# Patient Record
(demographics unavailable — no encounter records)

---

## 2024-10-17 NOTE — DATA REVIEWED
[de-identified] : MRI BRAIN IMPRESSION: 10/5/24    No evidence of intracranial abnormality.  [de-identified] : MRI ORBITS with and without contrast IMPRESSION: 10/5/24    Ovoid mass in the inferolateral left orbit as described above and with  suggestion of mild left exophthalmos, correlate clinically. Dr. Scott notified  by email. 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 Other etiologies such as schwannoma  cannot be entirely excluded.

## 2024-10-17 NOTE — PHYSICAL EXAM
[Oriented To Time, Place, And Person] : oriented to person, place, and time [Cranial Nerves Oculomotor (III)] : extraocular motion intact [Cranial Nerves Optic (II)] : visual acuity intact bilaterally,  pupils equal round and reactive to light [Cranial Nerves Trigeminal (V)] : facial sensation intact symmetrically [Cranial Nerves Facial (VII)] : face symmetrical [Cranial Nerves Vestibulocochlear (VIII)] : hearing was intact bilaterally [Cranial Nerves Glossopharyngeal (IX)] : tongue and palate midline [Cranial Nerves Accessory (XI - Cranial And Spinal)] : head turning and shoulder shrug symmetric [Cranial Nerves Hypoglossal (XII)] : there was no tongue deviation with protrusion [Motor Tone] : muscle tone was normal in all four extremities [Motor Strength] : muscle strength was normal in all four extremities [Abnormal Walk] : normal gait [Balance] : balance was intact [PERRL With Normal Accommodation] : pupils were equal in size, round, reactive to light, with normal accommodation [Extraocular Movements] : extraocular movements were intact

## 2024-10-17 NOTE — ASSESSMENT
[FreeTextEntry1] : BHAVIN HILL is a 30 year female who presents with mild left eye proptosis and a longstanding history of visual floaters.  I personally reviewed and interpreted her latest MRI brain and orbits with and without gadolinium 10/5/24 which demonstrates an extraconal mass in the left inferolateral orbit which measures approximately 1.9 x 1.4 x 1.8 cm. The mass demonstrates heterogeneous, mixed intermediate and hyperintense signal on T2 and hypointense signal on T1. On postcontrast images there is irregular, patchy enhancement. Subcentimeter T2 signal hypointensity at the posterior aspect of the lesion may represent calcification/phlebolith. The mass contacts the left lateral rectus muscle. 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   Natural history discussed. Alternative management strategies reviewed. Diagnostic cerebral angiography recommended to further characterize the lesion for diagnostic purposes. Risks including but not limited to bleeding, infection, vascular injury, life threatening hematoma, limb ischemia, need for surgical repair, renal failure, stroke, coma/death, weakness/paralysis, visual/sensory loss, loss of speech/language/cognitive/memory function, changes in personality/behavior, failure of procedure, need for additional procedures discussed. Ms. Hill understands the risks, benefits, alternatives and wishes to proceed. We will therefore schedule her for diagnostic cerebral angiogram under conscious sedation in the near future.    Ms Hill has expressed an interest in surgical resection of the mass. I explained that diagnostic angiography will help inform both indications and technical approach for resection. We will help facilitate an appointment with Dr. David Norris to discuss resection of the extraconal intraorbital mass following diagnostic cerebral angiography if indicated. She agrees to this plan.  A total of 60 minutes was spent relative to this encounter.

## 2024-10-17 NOTE — HISTORY OF PRESENT ILLNESS
[de-identified] : BHAVIN HILL is a 30 year female with a PMH of  "a thyroid problem"  who presents to the office today at the request of Dr. Huan Scott for neurosurgical consultation due to a mass found in the inferolateral left orbit on evaluation of left eye proptosis and floaters. She began having visual symptoms in 2017 when she noted 2 small black spots in her vision which were thought to be floaters. Floaters are seen bilaterally, though she feels they are more prominent with left eye vision than right. She denies diplopia or other vision changes. She initially saw Dr. Derrick Davidson in August 2024 who noted proptosis of the left eye, which patient has not noticed before.  Visual field testing was normal on 8/9/24 with Dr. Davidson.  Over the past several months she has also been experiencing hair loss, nausea and other symptoms, and she has been trying to get scheduled to see endocrinology.  Dr. Huan Scott ordered MRI Brain and Orbits for evaluation. The patient reports left periorbital pain which she temporally relates to the diagnosis of the intraorbital mass. Imaging reviewed and detailed below.   Med Hx: muscle spasms on hands and feet, BRCA positive Surg Hx: nasal turbinate reduction - 2023 Meds: vitamin D supplements, multivitamins with iron Allergies: amoxicillin, fentanyl (extreme sedation), hydrocodone (severe nausea, emesis) , oxycodone (severe nausea, emesis), vicodin (severe nausea, emesis) Soc Hx:  never smoker, social ETOH, lives with parents, works as an , mother had cancer

## 2024-10-22 NOTE — PHYSICAL EXAM
[Chaperone Present] : A chaperone was present in the examining room during all aspects of the physical examination [09862] : A chaperone was present during the pelvic exam. [Fully active, able to carry on all pre-disease performance without restriction] : Status 0 - Fully active, able to carry on all pre-disease performance without restriction

## 2024-10-22 NOTE — DISCUSSION/SUMMARY
[Reviewed Clinical Lab Test(s)] : Results of clinical tests were reviewed. [Discuss Alternatives/Risks/Benefits w/Patient] : All alternatives, risks, and benefits were discussed with the patient/family and all questions were answered.  Patient expressed good understanding and appreciates the importance of follow up as recommended. [Visit Time ___ Minutes] : [unfilled] minutes [Face to Face Time___ Minutes] : with [unfilled] minutes in face to face consultation. [FreeTextEntry1] : 31yo P0 w/ BRCA2+ mutation status. New to GYN exams and visits. Considering RR SO and also RR mastectomy. For baseline imaging, labs, first ever pap/hpv test and gc/chlam STI screening. -more than 50% of visit spent face to face with patient reviewing records and interpreting imaging/path/lab results, counseling and coordinating care  -I reviewed in detail her BRCA2+ status. I explained that in patients with known deleterious BRCA2 mutation there are several benefits to prophylactic BSO. One is the decreased risk of developing ovarian cancer, the one other benefit is a decrease in the risk of HR+ breast cancer. The lifetime risk of developing ovarian cancer in patients with deleterious BRCA2 mutation is 20% -I explained that the current recommendation from our governing bodies ( SGO and ACOG ) is that patients undergo RRBSO at the age of 35 or when childbearing is complete. Current data suggest that the origin of most high-grade serous tumors associated with BRCA1 mutation arise from the fallopian tube. A study assessing the effect of risk reducing salpingectomy (without removal of the ovaries) is ongoing. Without these results we cannot know if RRS will be as effective as RRBSO in decreasing risk of ovarian cancer. However, based on current SGO recommendations, it can be considered as an option in younger women who do not yet want to enter surgical menopause. -I explained to patient that although guidelines state age 35 or when childbearing complete, there is data that supports delaying RR surgery in BRCA2 carriers to 45-51yo since highest risk for ovarian cancer falls in 50-60th decades as opposed to BRCA1 carriers whose highest risk for ovarian cancer is in 40th decade. -I explained role of Hysterectomy and that it is not recommended as part of standard RR surgery guideline although can be an option for BRCA1+ patients given the 5-8% risk of developing uterine cancer in this population, or an elective option if patient desires removal. It can also be offered in cases of tamoxifen use as there is an increased risk of uterine pre-cancer and cancer with tamoxifen use. all questions answered and patient expressed understanding -I explained the side effects of pre-menopausal removal of the ovaries in detail. Effectively removal of the ovaries places patients in surgical menopause. Common symptoms include hot flashes, decrease in cognition, skin changes, weight gain, depression, increased heart disease risk/stroke risk/alzheimer's risk, and bone loss. I did review role of HRT as an option post-surgery to reduce sxs and risks. I explained that breast cancer risk does not change if HRT is started however most BRCA carriers prefer removing all breast tissue prior to starting HRT. -Finally I reviewed screening options, although screening modalities have been proven ineffective in detecting ovarian cancer at earlier stages. In the absence of better options pelvic exams, transvaginal ultrasounds and CA-125 every 6 months can be considered for patients who decline surgical intervention. -At this time patient considering RR SO as her first phase of risk reduction. She understands that she will still need monitoring of ovaries and will need a second surgery later in life to remove the ovaries. all questions answered and patient expressed understanding -baseline pelvic sono (transabdom only as per pt request) -ca125 -vaginal candidasis on exam - rx fluconazole sent -pap/hpv testing, Gc/Chlam testing -televisit for results review and to finalize surgical plan -pain/fever/bleeding precautions given

## 2024-10-22 NOTE — DISCUSSION/SUMMARY
[Reviewed Clinical Lab Test(s)] : Results of clinical tests were reviewed. [Discuss Alternatives/Risks/Benefits w/Patient] : All alternatives, risks, and benefits were discussed with the patient/family and all questions were answered.  Patient expressed good understanding and appreciates the importance of follow up as recommended. [Visit Time ___ Minutes] : [unfilled] minutes [Face to Face Time___ Minutes] : with [unfilled] minutes in face to face consultation. [FreeTextEntry1] : 31yo P0 w/ BRCA2+ mutation status. New to GYN exams and visits. Considering RR SO and also RR mastectomy. For baseline imaging, labs, first ever pap/hpv test and gc/chlam STI screening. -more than 50% of visit spent face to face with patient reviewing records and interpreting imaging/path/lab results, counseling and coordinating care  -I reviewed in detail her BRCA2+ status. I explained that in patients with known deleterious BRCA2 mutation there are several benefits to prophylactic BSO. One is the decreased risk of developing ovarian cancer, the one other benefit is a decrease in the risk of HR+ breast cancer. The lifetime risk of developing ovarian cancer in patients with deleterious BRCA2 mutation is 20% -I explained that the current recommendation from our governing bodies ( SGO and ACOG ) is that patients undergo RRBSO at the age of 35 or when childbearing is complete. Current data suggest that the origin of most high-grade serous tumors associated with BRCA1 mutation arise from the fallopian tube. A study assessing the effect of risk reducing salpingectomy (without removal of the ovaries) is ongoing. Without these results we cannot know if RRS will be as effective as RRBSO in decreasing risk of ovarian cancer. However, based on current SGO recommendations, it can be considered as an option in younger women who do not yet want to enter surgical menopause. -I explained to patient that although guidelines state age 35 or when childbearing complete, there is data that supports delaying RR surgery in BRCA2 carriers to 45-49yo since highest risk for ovarian cancer falls in 50-60th decades as opposed to BRCA1 carriers whose highest risk for ovarian cancer is in 40th decade. -I explained role of Hysterectomy and that it is not recommended as part of standard RR surgery guideline although can be an option for BRCA1+ patients given the 5-8% risk of developing uterine cancer in this population, or an elective option if patient desires removal. It can also be offered in cases of tamoxifen use as there is an increased risk of uterine pre-cancer and cancer with tamoxifen use. all questions answered and patient expressed understanding -I explained the side effects of pre-menopausal removal of the ovaries in detail. Effectively removal of the ovaries places patients in surgical menopause. Common symptoms include hot flashes, decrease in cognition, skin changes, weight gain, depression, increased heart disease risk/stroke risk/alzheimer's risk, and bone loss. I did review role of HRT as an option post-surgery to reduce sxs and risks. I explained that breast cancer risk does not change if HRT is started however most BRCA carriers prefer removing all breast tissue prior to starting HRT. -Finally I reviewed screening options, although screening modalities have been proven ineffective in detecting ovarian cancer at earlier stages. In the absence of better options pelvic exams, transvaginal ultrasounds and CA-125 every 6 months can be considered for patients who decline surgical intervention. -At this time patient considering RR SO as her first phase of risk reduction. She understands that she will still need monitoring of ovaries and will need a second surgery later in life to remove the ovaries. all questions answered and patient expressed understanding -baseline pelvic sono (transabdom only as per pt request) -ca125 -vaginal candidasis on exam - rx fluconazole sent -pap/hpv testing, Gc/Chlam testing -televisit for results review and to finalize surgical plan -pain/fever/bleeding precautions given

## 2024-10-22 NOTE — HISTORY OF PRESENT ILLNESS
[FreeTextEntry1] : 29yo P0 LMP 10/6/24 referred for BRCA2+ mutation. Pt's mother was recently diagnosed with vulvar cancer and BRCA2+, and mother was being seen by breast surgeon Dr Degroot and encouraged her daughter to meet with her and also have genetic testing. Pt then had testing sent from breast surgeon in September which also showed BRCA2+, but she has not received genetic counseling. She reports that she has been to a GYN but has  never had a pelvic exam, has never had a pap smear, has never had cervical swab for STI testing. She becomes tearful as she explains that she is uncomfortable with the idea of pelvic exams and also does not know much about her female anatomy. She denies any hx of sexual abuse or trauma.  She is currently on OCP for birth control, sexually active with 1 male partner, believes she has received Gardasil in adolescence. She also reports work up for a number of symptoms including swelling of left orbit and finding of mass on MRI will be undergoing angiogram, also with fatigue, muscle cramps/spasms, N/V/HA over the past few months. She notes she had abnormal thyroid function with repeat normal and possible anti-thyroid antibodies but has had challenges in getting endocrine appointment. denies current n/v/fever/bleeding/bloating. Reports normal urination and BMs.   PMHx: denies PSHx: turbinate reduction/deviated septum repair 3/2023 OBGYNHx: P0, sexually active, has never had GYN exam FamHx: mother with vulvar cancer and BRCA2+; paternal grandfather with an endocrine cancer; denies fam hx of breast ca, colon ca SocHx: social ETOH, denies smoking/illicit drug use All: aversions to oxycodone/vicodine/fentanyl - severe N/V   mammogram: mammo/sono just performed and WNL, will be for MRI in 6mths colonoscopy: N/A   Labs: 9/2024: BRCA2+

## 2024-10-22 NOTE — DISCUSSION/SUMMARY
[Reviewed Clinical Lab Test(s)] : Results of clinical tests were reviewed. [Discuss Alternatives/Risks/Benefits w/Patient] : All alternatives, risks, and benefits were discussed with the patient/family and all questions were answered.  Patient expressed good understanding and appreciates the importance of follow up as recommended. [Visit Time ___ Minutes] : [unfilled] minutes [Face to Face Time___ Minutes] : with [unfilled] minutes in face to face consultation. [FreeTextEntry1] : 29yo P0 w/ BRCA2+ mutation status. New to GYN exams and visits. Considering RR SO and also RR mastectomy. For baseline imaging, labs, first ever pap/hpv test and gc/chlam STI screening. -more than 50% of visit spent face to face with patient reviewing records and interpreting imaging/path/lab results, counseling and coordinating care  -I reviewed in detail her BRCA2+ status. I explained that in patients with known deleterious BRCA2 mutation there are several benefits to prophylactic BSO. One is the decreased risk of developing ovarian cancer, the one other benefit is a decrease in the risk of HR+ breast cancer. The lifetime risk of developing ovarian cancer in patients with deleterious BRCA2 mutation is 20% -I explained that the current recommendation from our governing bodies ( SGO and ACOG ) is that patients undergo RRBSO at the age of 35 or when childbearing is complete. Current data suggest that the origin of most high-grade serous tumors associated with BRCA1 mutation arise from the fallopian tube. A study assessing the effect of risk reducing salpingectomy (without removal of the ovaries) is ongoing. Without these results we cannot know if RRS will be as effective as RRBSO in decreasing risk of ovarian cancer. However, based on current SGO recommendations, it can be considered as an option in younger women who do not yet want to enter surgical menopause. -I explained to patient that although guidelines state age 35 or when childbearing complete, there is data that supports delaying RR surgery in BRCA2 carriers to 45-51yo since highest risk for ovarian cancer falls in 50-60th decades as opposed to BRCA1 carriers whose highest risk for ovarian cancer is in 40th decade. -I explained role of Hysterectomy and that it is not recommended as part of standard RR surgery guideline although can be an option for BRCA1+ patients given the 5-8% risk of developing uterine cancer in this population, or an elective option if patient desires removal. It can also be offered in cases of tamoxifen use as there is an increased risk of uterine pre-cancer and cancer with tamoxifen use. all questions answered and patient expressed understanding -I explained the side effects of pre-menopausal removal of the ovaries in detail. Effectively removal of the ovaries places patients in surgical menopause. Common symptoms include hot flashes, decrease in cognition, skin changes, weight gain, depression, increased heart disease risk/stroke risk/alzheimer's risk, and bone loss. I did review role of HRT as an option post-surgery to reduce sxs and risks. I explained that breast cancer risk does not change if HRT is started however most BRCA carriers prefer removing all breast tissue prior to starting HRT. -Finally I reviewed screening options, although screening modalities have been proven ineffective in detecting ovarian cancer at earlier stages. In the absence of better options pelvic exams, transvaginal ultrasounds and CA-125 every 6 months can be considered for patients who decline surgical intervention. -At this time patient considering RR SO as her first phase of risk reduction. She understands that she will still need monitoring of ovaries and will need a second surgery later in life to remove the ovaries. all questions answered and patient expressed understanding -baseline pelvic sono (transabdom only as per pt request) -ca125 -vaginal candidasis on exam - rx fluconazole sent -pap/hpv testing, Gc/Chlam testing -televisit for results review and to finalize surgical plan -pain/fever/bleeding precautions given

## 2024-10-22 NOTE — PHYSICAL EXAM
[Chaperone Present] : A chaperone was present in the examining room during all aspects of the physical examination [38517] : A chaperone was present during the pelvic exam. [Fully active, able to carry on all pre-disease performance without restriction] : Status 0 - Fully active, able to carry on all pre-disease performance without restriction

## 2024-10-22 NOTE — PHYSICAL EXAM
[Chaperone Present] : A chaperone was present in the examining room during all aspects of the physical examination [[id number]] : A chaperone was present during the pelvic exam. [Fully active, able to carry on all pre-disease performance without restriction] : Status 0 - Fully active, able to carry on all pre-disease performance without restriction

## 2024-10-22 NOTE — HISTORY OF PRESENT ILLNESS
[FreeTextEntry1] : 31yo P0 LMP 10/6/24 referred for BRCA2+ mutation. Pt's mother was recently diagnosed with vulvar cancer and BRCA2+, and mother was being seen by breast surgeon Dr Degroot and encouraged her daughter to meet with her and also have genetic testing. Pt then had testing sent from breast surgeon in September which also showed BRCA2+, but she has not received genetic counseling. She reports that she has been to a GYN but has  never had a pelvic exam, has never had a pap smear, has never had cervical swab for STI testing. She becomes tearful as she explains that she is uncomfortable with the idea of pelvic exams and also does not know much about her female anatomy. She denies any hx of sexual abuse or trauma.  She is currently on OCP for birth control, sexually active with 1 male partner, believes she has received Gardasil in adolescence. She also reports work up for a number of symptoms including swelling of left orbit and finding of mass on MRI will be undergoing angiogram, also with fatigue, muscle cramps/spasms, N/V/HA over the past few months. She notes she had abnormal thyroid function with repeat normal and possible anti-thyroid antibodies but has had challenges in getting endocrine appointment. denies current n/v/fever/bleeding/bloating. Reports normal urination and BMs.   PMHx: denies PSHx: turbinate reduction/deviated septum repair 3/2023 OBGYNHx: P0, sexually active, has never had GYN exam FamHx: mother with vulvar cancer and BRCA2+; paternal grandfather with an endocrine cancer; denies fam hx of breast ca, colon ca SocHx: social ETOH, denies smoking/illicit drug use All: aversions to oxycodone/vicodine/fentanyl - severe N/V   mammogram: mammo/sono just performed and WNL, will be for MRI in 6mths colonoscopy: N/A   Labs: 9/2024: BRCA2+

## 2024-11-11 NOTE — REASON FOR VISIT
[FreeTextEntry1] : BHAVIN HILL is a 30 year female seen in the office today for results review of a diagnostic cerebral angiogram performed independently and reviewed by me on 11/1/24. The angiogram went well and she was discharged later that day.  She was seen in the ER on the evening of 11/1/24 for acute onset of bilateral flashing lights with transient loss of peripheral vision and new onset headaches.  Her symptoms were relieved after she was given ketorolac, acetaminophen, diphenhydramine and metoclopramide and she was discharged home.  Her headaches returned and had been persistent. She also had another similar visual symptom the evening of 11/4/24.  She was seen by Dr. Huan Scott on 11/6/24 and was given a migraine cocktail for a diagnosis of ocular migraine syndrome which she started on 11/8/24.  She reports that she has not had any visual symptoms but still has mild left sided headaches.  She has seen Dr. Norris in operative consultation. Diagnostic cerebral angiogram reviewed detailed below.

## 2024-11-11 NOTE — DATA REVIEWED
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 No evidence for aneurysm, vascular malformation, dural arteriovenous fistula. Normal angiogram.  PROCEDURE: 1. Diagnostic cerebral angiogram with bilateral common carotid, internal carotid, external carotid artery and left vertebral artery. 2. Right common femoral angiogram for evaluation for placement of closure device.  REFERRING PHYSICIAN: Huan Scott M.D  DATE OF SERVICE: 2024  SURGEON: Aakash Palmer M.D.  ASSISTANT/S: JOVANA Rivero P.A  Clinical History: ?30 year old female presents with proptosis noted during ophthalmalogic examination and imaging demonstrating a left intraorbital / intraconal mass lesion suspicious for possible vascular malformation. Risks of diagnostic angiography including but not limited to bleeding, infection, vascular injury, life threatening hematoma, limb ischemia, renal failure, radiation exposure, stroke/coma/death, weakness/paralysis, visual/sensory loss, loss of speech/language/cognitive/memory function, changes in personality/behavior, failure to diagnose, need for additional procedures were discussed. Patient understands risks, benefits, alternatives, and wishes to proceed.  Anesthesia: Monitored anesthesia care. ? Medications: As per anesthesia.  Techniques and findings: Intravenous sedation was induced uneventfully and monitored by anesthesiology. Bilateral groins were prepped and draped in the usual sterile fashion. ?A timeout procedure was documented. The patient's name, date of birth, and medical record number as well as the procedures to be performed were confirmed by the entire team. After everyone in the room agreed, the procedure continued.  The right common femoral artery was accessed with a 4 Austrian micropuncture needle. The needle was exchanged for a 5 Austrian vascular sheath. The sheath was then connected to a continuous heparinized saline flush.  Diagnostic angiogram: Under fluoroscopic guidance, a 5 Austrian [employer] diagnostic catheter was advanced over a 0.035 inch angled glide guidewire through the abdominal and thoracic aorta into the right common carotid artery.  Right common carotid artery angiogram; cervical view Under fluoroscopic guidance, the catheter was advanced into the right common carotid artery. PA and lateral projections demonstrate antegrade flow into the right common, internal, and external carotid arteries. There is no significant atheromatous disease or hemodynamically significant stenosis.  Right external carotid artery angiogram; cranial view Under fluoroscopic guidance, the catheter was advanced into the right external carotid artery over the guidewire. PA and lateral projections demonstrate normal branching pattern of the external carotid artery. Capillary and venous phases are unremarkable. No vascular malformation or fistula is identified.  Right internal carotid artery angiogram; cranial view Under fluoroscopic guidance, the catheter was advanced into the right internal carotid artery. PA, lateral, and oblique projections demonstrate antegrade flow into the intracranial segments of the right internal carotid, right middle cerebral, and right anterior cerebral arteries. The right ophthalmic, posterior communicating, and anterior choroidal arteries are visualized. There is cross filling of the contralateral anterior cerebral artery. Capillary phase is unremarkable. Dural venous sinuses are patent. No aneurysm or vascular malformation is identified.  Left common carotid artery angiograms; cervical view Under fluoroscopic guidance, the catheter was advanced into the left common carotid artery. PA and lateral projections demonstrate antegrade flow into the left common and internal carotid arteries and the left external carotid artery. There is no significant atheromatous disease or hemodynamically significant stenosis.  Left external carotid artery angiogram; cranial view Under fluoroscopic guidance, the catheter was advanced into the left external carotid artery over the guidewire. PA and lateral projections demonstrate normal branching pattern of the external carotid artery. Capillary and venous phases are otherwise unremarkable. No vascular malformation or fistula is identified. No significant arterial supply to the left orbital mass is noted.  Left internal carotid artery angiogram; cranial view Under fluoroscopic guidance, the catheter was advanced into the left internal carotid artery. PA and lateral projections demonstrate antegrade flow into the intracranial segments of the left internal carotid, left middle cerebral, and left anterior cerebral arteries. The left ophthalmic, posterior communicating, and anterior choroidal arteries are visualized. There is cross filling of the contralateral anterior cerebral artery. Capillary phase is unremarkable. Dural venous sinuses are patent. No aneurysm or vascular malformation is identified. No significant arterial supply to the left orbital mass is noted.  Left vertebral artery angiogram; cervical and cranial view Under fluoroscopic guidance, the catheter was advanced into the left vertebral artery. PA and lateral projections of the cranium and upper cervical spine demonstrate antegrade flow into the left vertebral and basilar arteries. The bilateral posterior cerebral and superior cerebellar vessels fill normally. Bilateral AICA vessels fill normally. There is no significant retrograde filling of the right vertebral artery. Left PICA vessel fills normally. Capillary and venous phases are unremarkable. No aneurysm, vascular malformation, or fistula is identified.  Right common femoral artery angiogram for evaluation for placement of closure device Right common femoral artery is visualized. The puncture site is in the common femoral artery proximal to its bifurcation. Both the superficial and the deep femoral artery are visualized with anterograde flow. There is no evidence for dissection or occlusion, proximal or distal to the site of puncture.  CONCLUSION/HEMOSTASIS: The diagnostic catheter was removed. The 5 Austrian sheath was exchanged for a 5 Austrian Vascade extraluminal closure device supplemented with manual compression to achieve hemostasis.  COMPLICATIONS: The patient tolerated the procedure well. There were no complications during the procedure.   IMPRESSION: No evidence for aneurysm, vascular malformation, dural arteriovenous fistula. Hypovascular left intraorbital / intraconal mass lesion without significant arterial supply or overt aberrant venous drainage. Normal angiogram. 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

## 2024-11-11 NOTE — ASSESSMENT
[FreeTextEntry1] : BHAVIN HILL is a 30 year female status post diagnostic cerebral angiogram which I independently performed and reviewed and demonstrates no evidence for aneurysm, vascular malformation, dural arteriovenous fistula. Hypovascular left intraorbital / intraconal mass lesion without significant arterial supply or overt aberrant venous drainage noted. Normal angiogram. Findings support, but are not definitive for, the diagnosis of orbital cavernous venous malformation.   I reviewed the angiographic images with her and counseled her to continue clinical follow up with Dr. Scott for ocular migraine and headache management. She states that she is likely to proceed with surgery with Dr. Norris, though she is seeking a second opinion which I have supported. I explained that my role in her management was to perform diagnostic angiography at the request of Dr. Scott to assess vascularity of lesion in order to help support the development of a management strategy. This strategy however should be developed with an orbital/head and neck surgical team and ophthalmology, and I will defer to the recommendations of these content experts. I remain available for perioperative or intraoperative assistance if requested and required. She expressed her understanding and her gratitude for the care she has received thus far.    I have asked the patient to contact me for any symptomatic development or progression at which time we can obtain expedited follow up imaging. I explained that I remain available to her for any questions or concerns that may arise.    A total of 45 minutes was spent relative to this encounter.

## 2024-11-11 NOTE — DATA REVIEWED
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 No evidence for aneurysm, vascular malformation, dural arteriovenous fistula. Normal angiogram.  PROCEDURE: 1. Diagnostic cerebral angiogram with bilateral common carotid, internal carotid, external carotid artery and left vertebral artery. 2. Right common femoral angiogram for evaluation for placement of closure device.  REFERRING PHYSICIAN: Huan Scott M.D  DATE OF SERVICE: 2024  SURGEON: Aakash Palmer M.D.  ASSISTANT/S: JOVANA Rivero P.A  Clinical History: ?30 year old female presents with proptosis noted during ophthalmalogic examination and imaging demonstrating a left intraorbital / intraconal mass lesion suspicious for possible vascular malformation. Risks of diagnostic angiography including but not limited to bleeding, infection, vascular injury, life threatening hematoma, limb ischemia, renal failure, radiation exposure, stroke/coma/death, weakness/paralysis, visual/sensory loss, loss of speech/language/cognitive/memory function, changes in personality/behavior, failure to diagnose, need for additional procedures were discussed. Patient understands risks, benefits, alternatives, and wishes to proceed.  Anesthesia: Monitored anesthesia care. ? Medications: As per anesthesia.  Techniques and findings: Intravenous sedation was induced uneventfully and monitored by anesthesiology. Bilateral groins were prepped and draped in the usual sterile fashion. ?A timeout procedure was documented. The patient's name, date of birth, and medical record number as well as the procedures to be performed were confirmed by the entire team. After everyone in the room agreed, the procedure continued.  The right common femoral artery was accessed with a 4 Algerian micropuncture needle. The needle was exchanged for a 5 Algerian vascular sheath. The sheath was then connected to a continuous heparinized saline flush.  Diagnostic angiogram: Under fluoroscopic guidance, a 5 Algerian [employer] diagnostic catheter was advanced over a 0.035 inch angled glide guidewire through the abdominal and thoracic aorta into the right common carotid artery.  Right common carotid artery angiogram; cervical view Under fluoroscopic guidance, the catheter was advanced into the right common carotid artery. PA and lateral projections demonstrate antegrade flow into the right common, internal, and external carotid arteries. There is no significant atheromatous disease or hemodynamically significant stenosis.  Right external carotid artery angiogram; cranial view Under fluoroscopic guidance, the catheter was advanced into the right external carotid artery over the guidewire. PA and lateral projections demonstrate normal branching pattern of the external carotid artery. Capillary and venous phases are unremarkable. No vascular malformation or fistula is identified.  Right internal carotid artery angiogram; cranial view Under fluoroscopic guidance, the catheter was advanced into the right internal carotid artery. PA, lateral, and oblique projections demonstrate antegrade flow into the intracranial segments of the right internal carotid, right middle cerebral, and right anterior cerebral arteries. The right ophthalmic, posterior communicating, and anterior choroidal arteries are visualized. There is cross filling of the contralateral anterior cerebral artery. Capillary phase is unremarkable. Dural venous sinuses are patent. No aneurysm or vascular malformation is identified.  Left common carotid artery angiograms; cervical view Under fluoroscopic guidance, the catheter was advanced into the left common carotid artery. PA and lateral projections demonstrate antegrade flow into the left common and internal carotid arteries and the left external carotid artery. There is no significant atheromatous disease or hemodynamically significant stenosis.  Left external carotid artery angiogram; cranial view Under fluoroscopic guidance, the catheter was advanced into the left external carotid artery over the guidewire. PA and lateral projections demonstrate normal branching pattern of the external carotid artery. Capillary and venous phases are otherwise unremarkable. No vascular malformation or fistula is identified. No significant arterial supply to the left orbital mass is noted.  Left internal carotid artery angiogram; cranial view Under fluoroscopic guidance, the catheter was advanced into the left internal carotid artery. PA and lateral projections demonstrate antegrade flow into the intracranial segments of the left internal carotid, left middle cerebral, and left anterior cerebral arteries. The left ophthalmic, posterior communicating, and anterior choroidal arteries are visualized. There is cross filling of the contralateral anterior cerebral artery. Capillary phase is unremarkable. Dural venous sinuses are patent. No aneurysm or vascular malformation is identified. No significant arterial supply to the left orbital mass is noted.  Left vertebral artery angiogram; cervical and cranial view Under fluoroscopic guidance, the catheter was advanced into the left vertebral artery. PA and lateral projections of the cranium and upper cervical spine demonstrate antegrade flow into the left vertebral and basilar arteries. The bilateral posterior cerebral and superior cerebellar vessels fill normally. Bilateral AICA vessels fill normally. There is no significant retrograde filling of the right vertebral artery. Left PICA vessel fills normally. Capillary and venous phases are unremarkable. No aneurysm, vascular malformation, or fistula is identified.  Right common femoral artery angiogram for evaluation for placement of closure device Right common femoral artery is visualized. The puncture site is in the common femoral artery proximal to its bifurcation. Both the superficial and the deep femoral artery are visualized with anterograde flow. There is no evidence for dissection or occlusion, proximal or distal to the site of puncture.  CONCLUSION/HEMOSTASIS: The diagnostic catheter was removed. The 5 Algerian sheath was exchanged for a 5 Algerian Vascade extraluminal closure device supplemented with manual compression to achieve hemostasis.  COMPLICATIONS: The patient tolerated the procedure well. There were no complications during the procedure.   IMPRESSION: No evidence for aneurysm, vascular malformation, dural arteriovenous fistula. Hypovascular left intraorbital / intraconal mass lesion without significant arterial supply or overt aberrant venous drainage. Normal angiogram. 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

## 2024-11-12 NOTE — HISTORY OF PRESENT ILLNESS
[FreeTextEntry1] : 29yo P0 w/ BRCA2+ mutation status. New to GYN exams and visits. Considering RR SO and also RR mastectomy. Televisit today for results review of labs, baseline GYN testing and sono. Since last visit pt without any complaints. She has followed up with neurosurg and is scheduled to meet with ocular surgeon for surgery planned eye surgery in the next few weeks. She met with genetic counselors and has better understanding of BRCA 2 mutation and cancer risks.  She is interested in meeting with [facility] for egg freezing and she would like to move forward with RR salpingectomy some time in the new year/ spring pending eye surgery and changing jobs.  mammogram: mammo/sono just performed and WNL, will be for MRI in 6mths colonoscopy: N/A   Labs: 9/2024: BRCA2+ Pap/HPV 10/2024 = NILM/ HPV neg GC/chlam 10/2024 = neg ca125 10/18/24 = 19  Pelvic sono 10/24/24 FINDINGS: Uterus: 6.7 cm x 2.2 cm x 4.1 cm. Endometrium: 4 mm. Within normal limits.  Right ovary: 2.8 cm x 1.7 cm x 2.6 cm. Within normal limits. Left ovary: 2.9 cm x 1.7 cm x 2.8 cm. Within normal limits.  Fluid: None.  IMPRESSION: Unremarkable transabdominal pelvic sonogram   
I have reviewed and confirmed nurses' notes for patient's medications, allergies, medical history, and surgical history.

## 2024-11-12 NOTE — DISCUSSION/SUMMARY
[Reviewed Clinical Lab Test(s)] : Results of clinical tests were reviewed. [Discuss Alternatives/Risks/Benefits w/Patient] : All alternatives, risks, and benefits were discussed with the patient/family and all questions were answered.  Patient expressed good understanding and appreciates the importance of follow up as recommended. [Visit Time ___ Minutes] : [unfilled] minutes [Reviewed Radiology Report(s)] : Radiology reports were reviewed. [FreeTextEntry1] : 31yo P0 w/ BRCA2+ mutation status. New to GYN exams and visits. Considering RR SO and also RR mastectomy.  -entire televisit spent counseling patient and coordinating care. -I reviewed all normal baseline results and reviewed plan for continued surveillance with q6 month sono and ca125. pt understands that data does not show improved early detection with this screening and it is left to provider/ patient discretion -will plan for RR salpingectomy in the new year. patient understands we do not have enough data to say RRBS is same risk reduction as RRBSO and pt understands that she will need a second surgery later in life for RRBSO. all questions answered and patient expressed understanding -pelvic sono and ca125 in 6 months, future orders placed -rtc 6 months or sooner -referral to [facility] -pain/fever/bleeding precautions given

## 2024-11-14 NOTE — CONSULT LETTER
[Dear  ___] : Dear ~MARQUES, [Consult Letter:] : I had the pleasure of evaluating your patient, [unfilled]. [Please see my note below.] : Please see my note below. [Consult Closing:] : Thank you very much for allowing me to participate in the care of this patient.  If you have any questions, please do not hesitate to contact me. [Sincerely,] : Sincerely, [FreeTextEntry3] : Scott Sylvester M.D.

## 2024-11-14 NOTE — ASSESSMENT
[FreeTextEntry1] : BHAVIN HILL is a 30 year old female with a PMH of proptosis, ocular migraines who presents to the office today for neurosurgical consultation at the request of Dr. Aakash Palmer due to cervical radiculopathy.   We reviewed her MRI cervical spine in the office today. She has a central disc protrusion at C5-6 without any significant central or foraminal stenosis.   I have discussed the natural history and treatment options for cervical radiculopathy with the patient. I explained the indications for observation, conservative management, medical management, physical therapy, pain management approaches and surgery.   In the end, I recommend EMG/NCV upper extremities. She should also continue PT and see pain management. The patient may return to the office once imaging completed for further treatment planning. She was also advised to reach out to us sooner if she is to develop weakness or bowel/bladder symptoms.  I spent 60 minutes relative to this patient encounter.

## 2024-11-14 NOTE — HISTORY OF PRESENT ILLNESS
[de-identified] : BHAVIN HILL is a right handed 30 year old female with a PMH of cavernous hemangioma, Hashimotos thyroiditis, ocular migraines who presents to the office today for neurosurgical consultation at the request of Dr. Aakash Palmer due to cervical radiculopathy.   The pain is characterized as muscle spasm and tinging in nature.  It started June 2024. It radiates into left and right shoulder with tingling down both arms into hands and sometimes her feet. There has been no known trauma precipitating the pain.  The patient denies weakness of extremities or dexterity issues, bowel or bladder incontinence and gait disturbance. The discomfort keeps her from exercising. She has tried PT since August 2024, and pain medications including Prednisone, Naproxen, Tylenol in the last months without relief.  She has undergone imaging in the form of MRI cervical spine which revealed central disc protrusion at C5-C6 contributing to mild canal narrowing. No significant neural foraminal narrowing. No discrete cord abnormality (see detailed report below) She is a  and has not been figure skating due to the discomfort.  Of note, she underwent a diagnostic cerebral angiogram with my partner Dr. Palmer. She continues to have ocular migraines and following with Dr. Scott and Dr. 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  Surg Hx:  nasal turbinate reduction and deviated septum-2023  Meds:  Vitamin D, mvi, birth control, methylprednisone recently completed   Allergies: Fentanyl, hydrocodone, oxycodone, Vicodin   Soc Hx: nonsmoker, social EtOH, lives with parents, works as

## 2024-11-14 NOTE — HISTORY OF PRESENT ILLNESS
[de-identified] : BHAVIN HILL is a right handed 30 year old female with a PMH of cavernous hemangioma, Hashimotos thyroiditis, ocular migraines who presents to the office today for neurosurgical consultation at the request of Dr. Aakash Palmer due to cervical radiculopathy.   The pain is characterized as muscle spasm and tinging in nature.  It started June 2024. It radiates into left and right shoulder with tingling down both arms into hands and sometimes her feet. There has been no known trauma precipitating the pain.  The patient denies weakness of extremities or dexterity issues, bowel or bladder incontinence and gait disturbance. The discomfort keeps her from exercising. She has tried PT since August 2024, and pain medications including Prednisone, Naproxen, Tylenol in the last months without relief.  She has undergone imaging in the form of MRI cervical spine which revealed central disc protrusion at C5-C6 contributing to mild canal narrowing. No significant neural foraminal narrowing. No discrete cord abnormality (see detailed report below) She is a  and has not been figure skating due to the discomfort.  Of note, she underwent a diagnostic cerebral angiogram with my partner Dr. Plamer. She continues to have ocular migraines and following with Dr. Scott and Dr. 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  Surg Hx:  nasal turbinate reduction and deviated septum-2023  Meds:  Vitamin D, mvi, birth control, methylprednisone recently completed   Allergies: Fentanyl, hydrocodone, oxycodone, Vicodin   Soc Hx: nonsmoker, social EtOH, lives with parents, works as

## 2024-11-18 NOTE — ASSESSMENT
[FreeTextEntry1] : #Hair loss new problem, uncertain prognosis likely multifactorial, including 2/2 underlying thyroiditis, telogen effluvium reviewed diagnoses, pathogenesis, natural course, tx options recommend f/up with endocrinologist for tx of underlying thyroiditis discussed tx options including topical minoxidil, oral meds including po minoxidil, spironolactone - recommend holding off on any oral meds at this time due to upcoming surgery pt hesitant to start topical minoxidil - we reviewed risks, potential SE, expectations can continue ketoconazole 2% shampoo   RTC 2-3 months

## 2024-11-18 NOTE — PHYSICAL EXAM
[Alert] : alert [Oriented x 3] : ~L oriented x 3 [Declined] : declined [FreeTextEntry3] : Focused exam: -diffuse hair thinning

## 2024-11-18 NOTE — HISTORY OF PRESENT ILLNESS
[FreeTextEntry1] : hairloss [de-identified] : 30 year old female patient presents c/o hair loss.  in September saw hair loss in a photo of the back of her head feels she has had hair loss over the last few months saw another dermatologist, was told to use topical minoxidil and prescribed ketoconazole 2% shampoo labs 10/25/24: anti-TG, ATPO elevated, Total T4 elevated, T3 elevated, TSH wnl saw Dr. 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

## 2025-01-23 NOTE — HISTORY OF PRESENT ILLNESS
[Home] : at home, [unfilled] , at the time of the visit. [Medical Office: ([facility])___] : at the medical office located in  [Verbal consent obtained from patient] : the patient, [unfilled] [FreeTextEntry1] : hairloss [de-identified] : 30 year old female patient presents for follow up  11/2024 had eye surgery last month - went well thinks hair shedding is stable - her  did not notice any worsening  past history: in September saw hair loss in a photo of the back of her head feels she has had hair loss over the last few months saw another dermatologist, was told to use topical minoxidil and prescribed ketoconazole 2% shampoo labs 10/25/24: anti-TG, ATPO elevated, Total T4 elevated, T3 elevated, TSH wnl saw Dr. 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

## 2025-01-23 NOTE — PHYSICAL EXAM
[Alert] : alert [Oriented x 3] : ~L oriented x 3 [Declined] : declined [FreeTextEntry3] : Focused exam: exam limited over telehealth  no discrete abnormalities on exam

## 2025-05-22 NOTE — HISTORY OF PRESENT ILLNESS
[FreeTextEntry1] : 32yo P0 w/ BRCA2+ mutation status here for surveillance visit.  Since last visit patient reports doing well. she denies pain/fever/bleeding/bloating. She has normal activity tolerance and normal appetite. Reports normal urination and BMs. We reviewed recent sono and ca125 which are both normal. Since last visit she had left eye surgery and has recovered without issues. she moved to [place] and is happy at her new job. she continues to desire RR BS and prophy mastectomy. she is just sorting out timing since she is at a new job.  mammogram: mammo/sono just performed and WNL, will be for MRI in 6mths colonoscopy: N/A   Labs: ca125: 17 (5/16/25), 19 (10/18/24)  Pap/HPV: NILM/ HPV neg (10/18/24) GC/ chlam: Neg (10/18/24)  9/2024: BRCA2+  5/16/25 PELVIC US:  FINDINGS: Uterus: 6.6 cm x 3.6 cm x 4.1 cm. Within normal limits. Endometrium: 3 mm. Within normal limits.  Right ovary: 2.2 cm x 1.9 cm x 2.1 cm. Within normal limits. Normal arterial and venous waveforms. Left ovary: 2.0 cm x 1.3 cm x 1.8 cm. Within normal limits. Normal arterial and venous waveforms.  Fluid: None.  IMPRESSION: Normal pelvic sonogram.   10/24/24 Pelvic US: FINDINGS: Uterus: 6.7 cm x 2.2 cm x 4.1 cm. Endometrium: 4 mm. Within normal limits. Right ovary: 2.8 cm x 1.7 cm x 2.6 cm. Within normal limits. Left ovary: 2.9 cm x 1.7 cm x 2.8 cm. Within normal limits. Fluid: None. IMPRESSION: Unremarkable transabdominal pelvic sonogram

## 2025-05-22 NOTE — HISTORY OF PRESENT ILLNESS
[FreeTextEntry1] : 30yo P0 w/ BRCA2+ mutation status here for surveillance visit.  Since last visit patient reports doing well. she denies pain/fever/bleeding/bloating. She has normal activity tolerance and normal appetite. Reports normal urination and BMs. We reviewed recent sono and ca125 which are both normal. Since last visit she had left eye surgery and has recovered without issues. she moved to [place] and is happy at her new job. she continues to desire RR BS and prophy mastectomy. she is just sorting out timing since she is at a new job.  mammogram: mammo/sono just performed and WNL, will be for MRI in 6mths colonoscopy: N/A   Labs: ca125: 17 (5/16/25), 19 (10/18/24)  Pap/HPV: NILM/ HPV neg (10/18/24) GC/ chlam: Neg (10/18/24)  9/2024: BRCA2+  5/16/25 PELVIC US:  FINDINGS: Uterus: 6.6 cm x 3.6 cm x 4.1 cm. Within normal limits. Endometrium: 3 mm. Within normal limits.  Right ovary: 2.2 cm x 1.9 cm x 2.1 cm. Within normal limits. Normal arterial and venous waveforms. Left ovary: 2.0 cm x 1.3 cm x 1.8 cm. Within normal limits. Normal arterial and venous waveforms.  Fluid: None.  IMPRESSION: Normal pelvic sonogram.   10/24/24 Pelvic US: FINDINGS: Uterus: 6.7 cm x 2.2 cm x 4.1 cm. Endometrium: 4 mm. Within normal limits. Right ovary: 2.8 cm x 1.7 cm x 2.6 cm. Within normal limits. Left ovary: 2.9 cm x 1.7 cm x 2.8 cm. Within normal limits. Fluid: None. IMPRESSION: Unremarkable transabdominal pelvic sonogram

## 2025-05-22 NOTE — HISTORY OF PRESENT ILLNESS
[FreeTextEntry1] : 30yo P0 w/ BRCA2+ mutation status here for surveillance visit.  Since last visit patient reports doing well. she denies pain/fever/bleeding/bloating. She has normal activity tolerance and normal appetite. Reports normal urination and BMs. We reviewed recent sono and ca125 which are both normal. Since last visit she had left eye surgery and has recovered without issues. she moved to [place] and is happy at her new job. she continues to desire RR BS and prophy mastectomy. she is just sorting out timing since she is at a new job.  mammogram: mammo/sono just performed and WNL, will be for MRI in 6mths colonoscopy: N/A   Labs: ca125: 17 (5/16/25), 19 (10/18/24)  Pap/HPV: NILM/ HPV neg (10/18/24) GC/ chlam: Neg (10/18/24)  9/2024: BRCA2+  5/16/25 PELVIC US:  FINDINGS: Uterus: 6.6 cm x 3.6 cm x 4.1 cm. Within normal limits. Endometrium: 3 mm. Within normal limits.  Right ovary: 2.2 cm x 1.9 cm x 2.1 cm. Within normal limits. Normal arterial and venous waveforms. Left ovary: 2.0 cm x 1.3 cm x 1.8 cm. Within normal limits. Normal arterial and venous waveforms.  Fluid: None.  IMPRESSION: Normal pelvic sonogram.   10/24/24 Pelvic US: FINDINGS: Uterus: 6.7 cm x 2.2 cm x 4.1 cm. Endometrium: 4 mm. Within normal limits. Right ovary: 2.8 cm x 1.7 cm x 2.6 cm. Within normal limits. Left ovary: 2.9 cm x 1.7 cm x 2.8 cm. Within normal limits. Fluid: None. IMPRESSION: Unremarkable transabdominal pelvic sonogram

## 2025-05-22 NOTE — DISCUSSION/SUMMARY
[Reviewed Clinical Lab Test(s)] : Results of clinical tests were reviewed. [Reviewed Radiology Report(s)] : Radiology reports were reviewed. [Discuss Alternatives/Risks/Benefits w/Patient] : All alternatives, risks, and benefits were discussed with the patient/family and all questions were answered.  Patient expressed good understanding and appreciates the importance of follow up as recommended. [Visit Time ___ Minutes] : [unfilled] minutes [Face to Face Time___ Minutes] : with [unfilled] minutes in face to face consultation. [FreeTextEntry1] : 32yo P0 w/ BRCA2+ mutation status. New to GYN exams and visits. Considering RR SO and also RR mastectomy.  -more than 50% of visit spent face to face with patient counseling and coordinating care  -I reviewed all normal results and reviewed plan for continued surveillance with q6 month sono and ca125. pt understands that data does not show improved early detection with this screening and it is left to provider/ patient discretion -will plan for RR salpingectomy in the new year. patient understands we do not have enough data to say RRBS is same risk reduction as RRBSO and pt understands that she will need a second surgery later in life for RRBSO. all questions answered and patient expressed understanding -pelvic sono and ca125 in 6 months, future orders placed -rtc 6 months or sooner for exam and surveillance -referral to [facility] -pain/fever/bleeding precautions given